# Patient Record
Sex: MALE | Race: BLACK OR AFRICAN AMERICAN | NOT HISPANIC OR LATINO | URBAN - METROPOLITAN AREA
[De-identification: names, ages, dates, MRNs, and addresses within clinical notes are randomized per-mention and may not be internally consistent; named-entity substitution may affect disease eponyms.]

---

## 2023-03-10 ENCOUNTER — OFFICE VISIT (OUTPATIENT)
Dept: INTERNAL MEDICINE | Facility: CLINIC | Age: 28
End: 2023-03-10
Payer: COMMERCIAL

## 2023-03-10 VITALS
DIASTOLIC BLOOD PRESSURE: 98 MMHG | HEART RATE: 92 BPM | WEIGHT: 211.4 LBS | HEIGHT: 73 IN | SYSTOLIC BLOOD PRESSURE: 160 MMHG | BODY MASS INDEX: 28.02 KG/M2 | OXYGEN SATURATION: 99 %

## 2023-03-10 DIAGNOSIS — G62.9 PERIPHERAL POLYNEUROPATHY: ICD-10-CM

## 2023-03-10 DIAGNOSIS — Z11.59 ENCOUNTER FOR HEPATITIS C SCREENING TEST FOR LOW RISK PATIENT: ICD-10-CM

## 2023-03-10 DIAGNOSIS — I10 PRIMARY HYPERTENSION: Primary | ICD-10-CM

## 2023-03-10 DIAGNOSIS — M79.644 PAIN OF FINGER OF RIGHT HAND: ICD-10-CM

## 2023-03-10 DIAGNOSIS — E11.43 TYPE 2 DIABETES MELLITUS WITH DIABETIC AUTONOMIC NEUROPATHY, WITHOUT LONG-TERM CURRENT USE OF INSULIN: ICD-10-CM

## 2023-03-10 PROCEDURE — 99204 OFFICE O/P NEW MOD 45 MIN: CPT | Performed by: STUDENT IN AN ORGANIZED HEALTH CARE EDUCATION/TRAINING PROGRAM

## 2023-03-10 RX ORDER — GLIPIZIDE 10 MG/1
TABLET, FILM COATED, EXTENDED RELEASE ORAL
COMMUNITY
Start: 2022-12-16 | End: 2023-03-15 | Stop reason: SDUPTHER

## 2023-03-10 RX ORDER — LOSARTAN POTASSIUM 25 MG/1
25 TABLET ORAL DAILY
Qty: 30 TABLET | Refills: 2 | Status: SHIPPED | OUTPATIENT
Start: 2023-03-10

## 2023-03-10 RX ORDER — TRAMADOL HYDROCHLORIDE 50 MG/1
TABLET ORAL
COMMUNITY
Start: 2022-12-16

## 2023-03-10 NOTE — PROGRESS NOTES
Ike Pinto M.D.  Internal Medicine  [facility]  [address], [address]  [place] [address]  [phone]      Chief Complaint  Establish Care and Diabetes    SUBJECTIVE    History of Present Illness    Asya THAPA is a 27 y.o. male who presents to the office today as a new patient to establish care.     He has pain and numbeness on his finger. It hurts to bend his finger. He feels numbness in his feet. 4-5 months. He moved her from [place]. Left leg he feels pins and needles.     Index finger left hand had. No aggrevating factors. Nothing makes the pain better. He has pain in right fourth finger.     He was diagnosed in 2019. Last A1c was 6.4% in Aprl 2022. He does not check blood glucose at home. He has blurrier vision daily. He takes 20 mg glipizide in the moring. He feels like his BG is low later in the morning. He denies poluria. He does have polydipsia. He took metformin the past but is not sure why it was discontinued.     He has tramadol as needed for neck pain. He has received 15 tabs every month. He tried PT.     Elevated blood pressure. He gets headaches. He does not check BP at home. He has been told he has BP in past but never took medication.     Moved here from [place] and is helping [facility].     Review of Systems    No Known Allergies     Outpatient Medications Marked as Taking for the 3/10/23 encounter (Office Visit) with Ike Pinto MD   Medication Sig Dispense Refill   • glipizide (GLUCOTROL XL) 10 MG 24 hr tablet      • traMADol (ULTRAM) 50 MG tablet           Past Medical History:   Diagnosis Date   • Diabetes mellitus (HCC)    • Hyperlipidemia    • Hypertension    History reviewed. No pertinent surgical history.  Family History   Problem Relation Age of Onset   • No Known Problems Mother    • Diabetes Father     reports that he has never smoked. He has never used smokeless tobacco. He reports current alcohol use. He reports that he does not currently  "use drugs.    OBJECTIVE    Vital Signs:   /98   Pulse 92   Ht 185.4 cm (73\")   Wt 95.9 kg (211 lb 6.4 oz)   SpO2 99%   BMI 27.89 kg/m²     Physical Exam  Constitutional:       Appearance: Normal appearance. He is normal weight.   HENT:      Right Ear: Tympanic membrane normal.      Left Ear: Tympanic membrane normal.   Cardiovascular:      Rate and Rhythm: Normal rate and regular rhythm.      Pulses:           Dorsalis pedis pulses are 2+ on the right side and 2+ on the left side.      Heart sounds: Normal heart sounds. No murmur heard.  Pulmonary:      Effort: Pulmonary effort is normal.      Breath sounds: Normal breath sounds.   Abdominal:      General: Abdomen is flat. There is no distension.      Palpations: Abdomen is soft.      Tenderness: There is no abdominal tenderness.   Musculoskeletal:      Right hand: No deformity or tenderness. Normal range of motion. Normal pulse.      Left hand: No deformity or tenderness. Normal range of motion. Normal pulse.   Feet:      Right foot:      Protective Sensation: 7 sites tested. 6 sites sensed.      Skin integrity: Callus and dry skin present. No blister or fissure.      Toenail Condition: Right toenails are abnormally thick.      Left foot:      Protective Sensation: 7 sites tested. 6 sites sensed.      Skin integrity: Callus and dry skin present. No blister or fissure.      Toenail Condition: Left toenails are abnormally thick.   Skin:     General: Skin is warm and dry.   Neurological:      Mental Status: He is alert.   Psychiatric:         Mood and Affect: Mood normal.         Behavior: Behavior normal.         Thought Content: Thought content normal.            The following data was reviewed by: Ike Pinto MD on 03/10/2023:  Common labs    Common Labs 3/10/23 3/10/23 3/10/23 3/10/23    1422 1422 1422 1422   Glucose 105 (A)      BUN 10      Creatinine 1.04      Sodium 139      Potassium 4.0      Chloride 97      Calcium 9.4      Total Protein 8.1    "   Albumin 4.9      Total Bilirubin 0.3      Alkaline Phosphatase 66      AST (SGOT) 58 (A)      ALT (SGPT) 111 (A)      WBC   5.8    Hemoglobin   16.5    Hematocrit   50.4    Platelets   191    Hemoglobin A1C  7.2 (A)     Microalbumin, Urine    <3.0   (A) Abnormal value       Comments are available for some flowsheets but are not being displayed.           Data reviewed: No recent data available to review             ASSESSMENT & PLAN     Diagnoses and all orders for this visit:    1. Primary hypertension (Primary)  Assessment & Plan:  Hypertension is newly identified.  Dietary sodium restriction.  Weight loss.  Regular aerobic exercise.   Will start losartan.  Blood pressure will be reassessed at the next regular appointment.  Patient was asked to check their BP 3-5 times weekly at various times of day after being seated for 5 minutes or longer. Discussed that goal blood pressure is less than 130 systolic and less than 80 diastolic. Asked patient to bring log to next visit.       Orders:  -     losartan (Cozaar) 25 MG tablet; Take 1 tablet by mouth Daily.  Dispense: 30 tablet; Refill: 2    2. Type 2 diabetes mellitus with diabetic autonomic neuropathy, without long-term current use of insulin ([facility])  Assessment & Plan:  Diabetes is unchanged. He has not had his A1c checked in a year. He has symptoms of hyperglycemia and what sounds like foot numbness could be related to.  Continue current treatment regimen. But will need to decrease glipizide as he is taking more than maximum dose and this could cause hypoglycemia. Will Check A1c and discuss options with patient.  Diabetes will be reassessed in 3 months.    Orders:  -     Comprehensive Metabolic Panel  -     Hemoglobin A1c  -     Microalbumin / Creatinine Urine Ratio - Urine, Clean Catch    3. Peripheral polyneuropathy  Comments:  Slightly decreased sensation today but feet are very calloused.   Orders:  -     CBC & Differential  -     Vitamin B12  -     TSH Rfx On  Abnormal To Free T4    4. Encounter for hepatitis C screening test for low risk patient  -     HCV Antibody Rfx To Qnt PCR    5. Pain of finger of right hand  Comments:  Possible trigger finger  Orders:  -     Ambulatory Referral to Hand Surgery    Other orders  -     Interpretation:          Health Maintenance Due   Topic Date Due   • Hepatitis B (1 of 3 - 3-dose series) Never done   • COVID-19 Vaccine (1) Never done   • Pneumococcal Vaccine 0-64 (1 - PCV) Never done   • TDAP/TD VACCINES (1 - Tdap) Never done   • INFLUENZA VACCINE  Never done   • ANNUAL PHYSICAL  Never done   • DIABETIC FOOT EXAM  Never done   • LIPID PANEL  Never done   • DIABETIC EYE EXAM  Never done        Follow Up  Return in about 3 months (around 6/10/2023) for Recheck.    Patient/family had no further questions at this time and verbalized understanding of the plan discussed today.

## 2023-03-10 NOTE — PATIENT INSTRUCTIONS
Patient was asked to check their BP 3-5 times weekly at various times of day after being seated for 5 minutes or longer. Discussed that goal blood pressure is less than 130 systolic and less than 80 diastolic. Asked patient to bring log to next visit.

## 2023-03-11 LAB
ALBUMIN SERPL-MCNC: 4.9 G/DL (ref 4.1–5.2)
ALBUMIN/CREAT UR: <7 MG/G CREAT (ref 0–29)
ALBUMIN/GLOB SERPL: 1.5 {RATIO} (ref 1.2–2.2)
ALP SERPL-CCNC: 66 IU/L (ref 44–121)
ALT SERPL-CCNC: 111 IU/L (ref 0–44)
AST SERPL-CCNC: 58 IU/L (ref 0–40)
BASOPHILS # BLD AUTO: 0 X10E3/UL (ref 0–0.2)
BASOPHILS NFR BLD AUTO: 1 %
BILIRUB SERPL-MCNC: 0.3 MG/DL (ref 0–1.2)
BUN SERPL-MCNC: 10 MG/DL (ref 6–20)
BUN/CREAT SERPL: 10 (ref 9–20)
CALCIUM SERPL-MCNC: 9.4 MG/DL (ref 8.7–10.2)
CHLORIDE SERPL-SCNC: 97 MMOL/L (ref 96–106)
CO2 SERPL-SCNC: 28 MMOL/L (ref 20–29)
CREAT SERPL-MCNC: 1.04 MG/DL (ref 0.76–1.27)
CREAT UR-MCNC: 42 MG/DL
EGFRCR SERPLBLD CKD-EPI 2021: 100 ML/MIN/1.73
EOSINOPHIL # BLD AUTO: 0.1 X10E3/UL (ref 0–0.4)
EOSINOPHIL NFR BLD AUTO: 2 %
ERYTHROCYTE [DISTWIDTH] IN BLOOD BY AUTOMATED COUNT: 12.1 % (ref 11.6–15.4)
GLOBULIN SER CALC-MCNC: 3.2 G/DL (ref 1.5–4.5)
GLUCOSE SERPL-MCNC: 105 MG/DL (ref 70–99)
HBA1C MFR BLD: 7.2 % (ref 4.8–5.6)
HCT VFR BLD AUTO: 50.4 % (ref 37.5–51)
HCV AB SERPL QL IA: NORMAL
HCV IGG SERPL QL IA: NON REACTIVE
HGB BLD-MCNC: 16.5 G/DL (ref 13–17.7)
IMM GRANULOCYTES # BLD AUTO: 0 X10E3/UL (ref 0–0.1)
IMM GRANULOCYTES NFR BLD AUTO: 0 %
LYMPHOCYTES # BLD AUTO: 2.2 X10E3/UL (ref 0.7–3.1)
LYMPHOCYTES NFR BLD AUTO: 38 %
MCH RBC QN AUTO: 27.4 PG (ref 26.6–33)
MCHC RBC AUTO-ENTMCNC: 32.7 G/DL (ref 31.5–35.7)
MCV RBC AUTO: 84 FL (ref 79–97)
MICROALBUMIN UR-MCNC: <3 UG/ML
MONOCYTES # BLD AUTO: 0.4 X10E3/UL (ref 0.1–0.9)
MONOCYTES NFR BLD AUTO: 6 %
NEUTROPHILS # BLD AUTO: 3 X10E3/UL (ref 1.4–7)
NEUTROPHILS NFR BLD AUTO: 53 %
NRBC BLD AUTO-RTO: 1 % (ref 0–0)
PLATELET # BLD AUTO: 191 X10E3/UL (ref 150–450)
POTASSIUM SERPL-SCNC: 4 MMOL/L (ref 3.5–5.2)
PROT SERPL-MCNC: 8.1 G/DL (ref 6–8.5)
RBC # BLD AUTO: 6.02 X10E6/UL (ref 4.14–5.8)
SODIUM SERPL-SCNC: 139 MMOL/L (ref 134–144)
TSH SERPL DL<=0.005 MIU/L-ACNC: 0.96 UIU/ML (ref 0.45–4.5)
VIT B12 SERPL-MCNC: 658 PG/ML (ref 232–1245)
WBC # BLD AUTO: 5.8 X10E3/UL (ref 3.4–10.8)

## 2023-03-11 NOTE — ASSESSMENT & PLAN NOTE
Hypertension is newly identified.  Dietary sodium restriction.  Weight loss.  Regular aerobic exercise.   Will start losartan.  Blood pressure will be reassessed at the next regular appointment.  Patient was asked to check their BP 3-5 times weekly at various times of day after being seated for 5 minutes or longer. Discussed that goal blood pressure is less than 130 systolic and less than 80 diastolic. Asked patient to bring log to next visit.

## 2023-03-11 NOTE — ASSESSMENT & PLAN NOTE
Diabetes is unchanged. He has not had his A1c checked in a year. He has symptoms of hyperglycemia and what sounds like foot numbness could be related to.  Continue current treatment regimen. But will need to decrease glipizide as he is taking more than maximum dose and this could cause hypoglycemia. Will Check A1c and discuss options with patient.  Diabetes will be reassessed in 3 months.

## 2023-03-14 ENCOUNTER — TELEPHONE (OUTPATIENT)
Dept: INTERNAL MEDICINE | Facility: CLINIC | Age: 28
End: 2023-03-14
Payer: COMMERCIAL

## 2023-03-14 DIAGNOSIS — R74.01 ELEVATED LIVER TRANSAMINASE LEVEL: Primary | ICD-10-CM

## 2023-03-14 NOTE — TELEPHONE ENCOUNTER
I called Asya THAPA at [phone] at 14:55 EDT     I called patient to discuss lab results but there was no answer.  I left a message to call our office back with our number.

## 2023-03-15 ENCOUNTER — TELEPHONE (OUTPATIENT)
Dept: INTERNAL MEDICINE | Facility: CLINIC | Age: 28
End: 2023-03-15
Payer: COMMERCIAL

## 2023-03-15 DIAGNOSIS — E11.43 TYPE 2 DIABETES MELLITUS WITH DIABETIC AUTONOMIC NEUROPATHY, WITHOUT LONG-TERM CURRENT USE OF INSULIN: ICD-10-CM

## 2023-03-15 DIAGNOSIS — I10 PRIMARY HYPERTENSION: ICD-10-CM

## 2023-03-15 RX ORDER — LANCETS
EACH MISCELLANEOUS
Qty: 100 EACH | Refills: 2 | Status: SHIPPED | OUTPATIENT
Start: 2023-03-15

## 2023-03-15 RX ORDER — SEMAGLUTIDE 1.34 MG/ML
INJECTION, SOLUTION SUBCUTANEOUS
Qty: 1.5 ML | Refills: 0 | Status: SHIPPED | OUTPATIENT
Start: 2023-03-15 | End: 2023-04-26

## 2023-03-15 RX ORDER — GLIPIZIDE 10 MG/1
10 TABLET, FILM COATED, EXTENDED RELEASE ORAL DAILY
Qty: 30 TABLET | Refills: 2 | Status: SHIPPED | OUTPATIENT
Start: 2023-03-15

## 2023-03-15 RX ORDER — DIPHENHYDRAMINE HYDROCHLORIDE 25 MG/1
CAPSULE, LIQUID FILLED ORAL
Qty: 1 EACH | Refills: 0 | Status: SHIPPED | OUTPATIENT
Start: 2023-03-15

## 2023-03-15 RX ORDER — GLUCOSAMINE HCL/CHONDROITIN SU 500-400 MG
CAPSULE ORAL
Qty: 100 EACH | Refills: 2 | Status: SHIPPED | OUTPATIENT
Start: 2023-03-15

## 2023-03-15 NOTE — TELEPHONE ENCOUNTER
Caller: Asya THAPA    Relationship: Self    Best call back number: [phone]    Who are you requesting to speak with (clinical staff, provider,  specific staff member): DR NUNEZ    What was the call regarding: PATIENT WOULD LIKE TO SPEAK WITH DR NUNEZ, HE DID NOT SAY WHAT THIS IS REGARDING.    Do you require a callback: YES

## 2023-03-15 NOTE — TELEPHONE ENCOUNTER
"Patient called our clinic back about test results    I would like I would like him to continue to take glipizide but only take one 10 mg tablet a day.  Discussed options for diabetes medications including GLP-1 agonist, SGLT2 inhibitor, DPP 4.  I think a weekly injectable would be a good option for patient because of his inconsistency in taking medication.  Patient states \"what ever you think\".     His AST and ALT, which are markers of stress/damage to the liver are also elevated. He should avoid excess Tylenol and alcohol. I would like to check another CMP in 4 to 6-weeks. His vitamin B12 and thyroid function are normal. I suspect some of his neuropathy symptoms could be due to diabetes. All of his blood counts are normal.    He only took losartan once.  He had stomach swelling so he has not taken it again.  Discussed that this is not a normal side effect of losartan and encourage patient to try it again.  Encouraged him to get a blood pressure monitor and monitor his blood pressure at home.  "

## 2023-04-19 DIAGNOSIS — E11.43 TYPE 2 DIABETES MELLITUS WITH DIABETIC AUTONOMIC NEUROPATHY, WITHOUT LONG-TERM CURRENT USE OF INSULIN: ICD-10-CM

## 2023-04-19 RX ORDER — SEMAGLUTIDE 2.68 MG/ML
0.5 INJECTION, SOLUTION SUBCUTANEOUS WEEKLY
Qty: 3 ML | Refills: 2 | Status: SHIPPED | OUTPATIENT
Start: 2023-04-19